# Patient Record
Sex: FEMALE | Race: WHITE | NOT HISPANIC OR LATINO | URBAN - METROPOLITAN AREA
[De-identification: names, ages, dates, MRNs, and addresses within clinical notes are randomized per-mention and may not be internally consistent; named-entity substitution may affect disease eponyms.]

---

## 2021-03-03 ENCOUNTER — APPOINTMENT (RX ONLY)
Dept: URBAN - METROPOLITAN AREA CLINIC 146 | Facility: CLINIC | Age: 31
Setting detail: DERMATOLOGY
End: 2021-03-03

## 2021-03-03 VITALS — TEMPERATURE: 98 F

## 2021-03-03 DIAGNOSIS — L91.8 OTHER HYPERTROPHIC DISORDERS OF THE SKIN: ICD-10-CM

## 2021-03-03 DIAGNOSIS — L30.5 PITYRIASIS ALBA: ICD-10-CM

## 2021-03-03 DIAGNOSIS — L21.8 OTHER SEBORRHEIC DERMATITIS: ICD-10-CM

## 2021-03-03 PROCEDURE — ? COUNSELING

## 2021-03-03 PROCEDURE — ? PRESCRIPTION

## 2021-03-03 PROCEDURE — ? TREATMENT REGIMEN

## 2021-03-03 PROCEDURE — 11200 RMVL SKIN TAGS UP TO&INC 15: CPT

## 2021-03-03 PROCEDURE — ? PATIENT SPECIFIC COUNSELING

## 2021-03-03 PROCEDURE — ? ADDITIONAL NOTES

## 2021-03-03 PROCEDURE — 99203 OFFICE O/P NEW LOW 30 MIN: CPT | Mod: 25

## 2021-03-03 PROCEDURE — ? SKIN TAG REMOVAL

## 2021-03-03 PROCEDURE — ? FULL BODY SKIN EXAM - DECLINED

## 2021-03-03 PROCEDURE — ? MEDICATION COUNSELING

## 2021-03-03 RX ORDER — CLOBETASOL PROPIONATE 0.5 MG/ML
SOLUTION TOPICAL
Qty: 1 | Refills: 3 | Status: ERX | COMMUNITY
Start: 2021-03-03

## 2021-03-03 RX ORDER — KETOCONAZOLE 20 MG/ML
SHAMPOO, SUSPENSION TOPICAL BIW
Qty: 1 | Refills: 5 | Status: ERX | COMMUNITY
Start: 2021-03-03

## 2021-03-03 RX ORDER — HYDROCORTISONE 25 MG/G
CREAM TOPICAL
Qty: 454 | Refills: 2 | Status: ERX | COMMUNITY
Start: 2021-03-03

## 2021-03-03 RX ADMIN — HYDROCORTISONE: 25 CREAM TOPICAL at 00:00

## 2021-03-03 RX ADMIN — CLOBETASOL PROPIONATE: 0.5 SOLUTION TOPICAL at 00:00

## 2021-03-03 RX ADMIN — KETOCONAZOLE: 20 SHAMPOO, SUSPENSION TOPICAL at 00:00

## 2021-03-03 ASSESSMENT — LOCATION SIMPLE DESCRIPTION DERM
LOCATION SIMPLE: RIGHT AXILLARY VAULT
LOCATION SIMPLE: LEFT POSTERIOR UPPER ARM
LOCATION SIMPLE: RIGHT POSTERIOR UPPER ARM
LOCATION SIMPLE: RIGHT CHEEK
LOCATION SIMPLE: ANTERIOR SCALP
LOCATION SIMPLE: LEFT CHEEK

## 2021-03-03 ASSESSMENT — LOCATION ZONE DERM
LOCATION ZONE: AXILLAE
LOCATION ZONE: FACE
LOCATION ZONE: SCALP
LOCATION ZONE: ARM

## 2021-03-03 ASSESSMENT — LOCATION DETAILED DESCRIPTION DERM
LOCATION DETAILED: MID-FRONTAL SCALP
LOCATION DETAILED: RIGHT INFERIOR CENTRAL MALAR CHEEK
LOCATION DETAILED: RIGHT AXILLARY VAULT
LOCATION DETAILED: RIGHT PROXIMAL POSTERIOR UPPER ARM
LOCATION DETAILED: LEFT CENTRAL MALAR CHEEK
LOCATION DETAILED: LEFT PROXIMAL POSTERIOR UPPER ARM

## 2021-03-03 NOTE — PROCEDURE: SKIN TAG REMOVAL
Anesthesia Volume In Cc: 0.2
Medical Necessity Clause: This procedure was medically necessary because the lesions that were treated were:
Anesthesia Type: 2% lidocaine without epinephrine
Hemostasis: Nabil's
Include Z78.9 (Other Specified Conditions Influencing Health Status) As An Associated Diagnosis?: Yes
Detail Level: Detailed
Consent: Written consent obtained and the risks of skin tag removal was reviewed with the patient including but not limited to bleeding, pigmentary change, infection, pain, and remote possibility of scarring.
Medical Necessity Information: It is in your best interest to select a reason for this procedure from the list below. All of these items fulfill various CMS LCD requirements except the new and changing color options.

## 2021-03-03 NOTE — PROCEDURE: TREATMENT REGIMEN
Initiate Treatment: Clobetasol scalp solution as directed
Continue Regimen: Ketoconazole shampoo as directed
Detail Level: Zone

## 2021-03-03 NOTE — PROCEDURE: MEDICATION COUNSELING
Xelmalathiz Pregnancy And Lactation Text: This medication is Pregnancy Category D and is not considered safe during pregnancy.  The risk during breast feeding is also uncertain.